# Patient Record
Sex: MALE | Race: WHITE | NOT HISPANIC OR LATINO | URBAN - METROPOLITAN AREA
[De-identification: names, ages, dates, MRNs, and addresses within clinical notes are randomized per-mention and may not be internally consistent; named-entity substitution may affect disease eponyms.]

---

## 2018-01-01 ENCOUNTER — EMERGENCY (EMERGENCY)
Facility: HOSPITAL | Age: 83
LOS: 0 days | End: 2018-04-29
Attending: EMERGENCY MEDICINE
Payer: MEDICARE

## 2018-01-01 DIAGNOSIS — I46.9 CARDIAC ARREST, CAUSE UNSPECIFIED: ICD-10-CM

## 2018-01-01 DIAGNOSIS — E11.9 TYPE 2 DIABETES MELLITUS WITHOUT COMPLICATIONS: ICD-10-CM

## 2018-01-01 DIAGNOSIS — I10 ESSENTIAL (PRIMARY) HYPERTENSION: ICD-10-CM

## 2018-01-01 DIAGNOSIS — T17.928A FOOD IN RESPIRATORY TRACT, PART UNSPECIFIED CAUSING OTHER INJURY, INITIAL ENCOUNTER: ICD-10-CM

## 2018-01-01 DIAGNOSIS — G91.9 HYDROCEPHALUS, UNSPECIFIED: ICD-10-CM

## 2018-01-01 DIAGNOSIS — I24.9 ACUTE ISCHEMIC HEART DISEASE, UNSPECIFIED: ICD-10-CM

## 2018-01-01 DIAGNOSIS — Z86.73 PERSONAL HISTORY OF TRANSIENT ISCHEMIC ATTACK (TIA), AND CEREBRAL INFARCTION WITHOUT RESIDUAL DEFICITS: ICD-10-CM

## 2018-01-01 DIAGNOSIS — R55 SYNCOPE AND COLLAPSE: ICD-10-CM

## 2018-01-01 LAB — GLUCOSE BLDC GLUCOMTR-MCNC: 349 MG/DL — HIGH (ref 70–99)

## 2018-01-01 PROCEDURE — 31500 INSERT EMERGENCY AIRWAY: CPT

## 2018-01-01 PROCEDURE — 99285 EMERGENCY DEPT VISIT HI MDM: CPT | Mod: 25

## 2018-01-01 PROCEDURE — 92950 HEART/LUNG RESUSCITATION CPR: CPT

## 2018-04-29 NOTE — ED ADULT NURSE NOTE - PMH
ACS (acute coronary syndrome)  cardiac stents x 3  CVA (cerebral infarction)  left side weakness  Diabetes    Hydrocephalus  with a shunt  Hypertension

## 2018-04-29 NOTE — ED PROVIDER NOTE - OBJECTIVE STATEMENT
83 yo M with cardiac arrest.  Pt. was home, allegedly with home health aid when he choked on food.  Pt. became unresponsive and passed out.  911 was called, and pt. was found to be in cardiac arrest.  CPR was begun and pt. was intubated in field.  Tube was replaced in ED because intubation in field was esophageal.  Pt. was pulseless and without cardiac activity on presentation.  CPR was continued in ER for an addition 20 minutes.  tube was confirmed by capnography, no ROSC obtained.  US at bedside showed cardiac standstill and Pt. was pronounced at 7:54 pm.  Family notified, son and daughter in law at bedside.    ROS: unobtainable from patient  PMH: ACS, CVA, diabetes, hydrocephalus with shunt, HTN; Meds: See EMR; SH: Denies smoking/drinking/drug use

## 2018-04-29 NOTE — ED PROVIDER NOTE - CRITICAL CARE PROVIDED
consult w/ pt's family directly relating to pts condition/consultation with other physicians/conducted a detailed discussion of DNR status/additional history taking/interpretation of diagnostic studies/direct patient care (not related to procedure)/documentation

## 2018-04-29 NOTE — ED PROVIDER NOTE - PHYSICAL EXAMINATION
Gen: intubated, no spontaneous movement  Head: ncat, pupils fixed and dilated 7 mm b/l, no gag reflex, no corneal   Neck: supple, no lymphadenopathy, no midline deviation  Heart: rrr, no m/r/g  Lungs: CTA b/l, no rales/ronchi/wheezes  Abd: soft, nontender, non-distended, no rebound or guarding  Ext: no clubbing/cyanosis/edema  Neuro: as above, not cooperative with exam